# Patient Record
Sex: MALE | URBAN - METROPOLITAN AREA
[De-identification: names, ages, dates, MRNs, and addresses within clinical notes are randomized per-mention and may not be internally consistent; named-entity substitution may affect disease eponyms.]

---

## 2017-08-17 ENCOUNTER — HOSPITAL ENCOUNTER (EMERGENCY)
Facility: MEDICAL CENTER | Age: 52
End: 2017-08-17

## 2017-08-17 VITALS
HEIGHT: 69 IN | BODY MASS INDEX: 33.27 KG/M2 | RESPIRATION RATE: 18 BRPM | DIASTOLIC BLOOD PRESSURE: 103 MMHG | OXYGEN SATURATION: 98 % | HEART RATE: 75 BPM | TEMPERATURE: 97.5 F | WEIGHT: 224.65 LBS | SYSTOLIC BLOOD PRESSURE: 167 MMHG

## 2017-08-17 PROCEDURE — 302449 STATCHG TRIAGE ONLY (STATISTIC)

## 2017-08-18 NOTE — ED NOTES
"Willi Angeles  52 y.o.  Chief Complaint   Patient presents with   • Dizziness     States that he has episodes where he feels like everything is spinning, his legs begin shaking and he has to get down on the ground and sometimes vomits. Started 3 days ago, reports 2 eposides daily, denies LOC. Also complains of an odd sensation in the back of his head \"not like pain, but not normal\"     Pt ambulatory to triage without difficulty.   "